# Patient Record
Sex: FEMALE | Race: WHITE | Employment: PART TIME | URBAN - NONMETROPOLITAN AREA
[De-identification: names, ages, dates, MRNs, and addresses within clinical notes are randomized per-mention and may not be internally consistent; named-entity substitution may affect disease eponyms.]

---

## 2021-10-10 ENCOUNTER — E-VISIT (OUTPATIENT)
Dept: OTHER | Age: 31
End: 2021-10-10

## 2021-10-10 DIAGNOSIS — L20.89 OTHER ATOPIC DERMATITIS: Primary | ICD-10-CM

## 2021-11-02 ENCOUNTER — E-VISIT (OUTPATIENT)
Dept: PRIMARY CARE CLINIC | Age: 31
End: 2021-11-02

## 2021-11-02 DIAGNOSIS — B35.4 TINEA CORPORIS: Primary | ICD-10-CM

## 2021-11-02 PROCEDURE — 99423 OL DIG E/M SVC 21+ MIN: CPT | Performed by: INTERNAL MEDICINE

## 2021-11-02 RX ORDER — TERBINAFINE HYDROCHLORIDE 250 MG/1
250 TABLET ORAL DAILY
Qty: 14 TABLET | Refills: 0 | Status: SHIPPED | OUTPATIENT
Start: 2021-11-02 | End: 2021-11-16

## 2021-11-02 NOTE — PROGRESS NOTES
I believe you have tinea corporis ( ringworm) in the affected areas. Terbinafine 250 mg by mouth daily for 2 weeks should help get rid of the rash. This is a fungal infection which is occasionally transmitted by pets. Children can transfer the fungal rash as well. The topical steroid reduces inflammation but it does not get rid of fungal infections. Please do not use the topical steroid on the area in the future. Follow-up with your primary care physician. 21+ minutes were spent on this E visit.